# Patient Record
Sex: MALE | Race: WHITE | URBAN - METROPOLITAN AREA
[De-identification: names, ages, dates, MRNs, and addresses within clinical notes are randomized per-mention and may not be internally consistent; named-entity substitution may affect disease eponyms.]

---

## 2017-03-29 PROCEDURE — 81003 URINALYSIS AUTO W/O SCOPE: CPT | Performed by: PHYSICIAN ASSISTANT

## 2017-04-04 PROCEDURE — 82607 VITAMIN B-12: CPT | Performed by: PHYSICIAN ASSISTANT

## 2017-04-04 PROCEDURE — 82746 ASSAY OF FOLIC ACID SERUM: CPT | Performed by: PHYSICIAN ASSISTANT

## 2017-04-04 PROCEDURE — 36415 COLL VENOUS BLD VENIPUNCTURE: CPT | Performed by: PHYSICIAN ASSISTANT

## 2017-05-26 PROBLEM — H25.13 AGE-RELATED NUCLEAR CATARACT, BILATERAL: Status: ACTIVE | Noted: 2017-05-26

## 2017-05-26 PROBLEM — H02.826 SEBACEOUS CYST OF EYELID, LEFT: Status: ACTIVE | Noted: 2017-05-26

## 2018-04-24 PROCEDURE — 81003 URINALYSIS AUTO W/O SCOPE: CPT | Performed by: PHYSICIAN ASSISTANT

## 2018-04-26 PROBLEM — N52.9 ERECTILE DYSFUNCTION, UNSPECIFIED ERECTILE DYSFUNCTION TYPE: Status: ACTIVE | Noted: 2018-04-26

## 2018-05-03 PROCEDURE — 88344 IMHCHEM/IMCYTCHM EA MLT ANTB: CPT | Performed by: UROLOGY

## 2018-05-03 PROCEDURE — 88305 TISSUE EXAM BY PATHOLOGIST: CPT | Performed by: UROLOGY

## 2019-10-10 PROBLEM — I67.1 BRAIN ANEURYSM: Status: ACTIVE | Noted: 2019-10-10

## 2020-03-07 PROBLEM — Z91.19 NON-COMPLIANCE: Status: ACTIVE | Noted: 2020-03-07

## 2020-03-27 PROBLEM — R79.1 PROLONGED BLEEDING TIME: Status: ACTIVE | Noted: 2020-03-27

## 2020-04-03 ENCOUNTER — OFFICE VISIT (OUTPATIENT)
Dept: WOUND CARE | Facility: HOSPITAL | Age: 70
End: 2020-04-03
Attending: NURSE PRACTITIONER
Payer: MEDICARE

## 2020-04-03 DIAGNOSIS — L97.812 NON-PRESSURE CHRONIC ULCER OF OTHER PART OF RIGHT LOWER LEG WITH FAT LAYER EXPOSED (HCC): Primary | ICD-10-CM

## 2020-04-03 DIAGNOSIS — F17.218 NICOTINE DEPENDENCE, CIGARETTES, WITH OTHER NICOTINE-INDUCED DISORDERS: ICD-10-CM

## 2020-04-03 DIAGNOSIS — I87.331 CHRONIC VENOUS HYPERTENSION (IDIOPATHIC) WITH ULCER AND INFLAMMATION OF RIGHT LOWER EXTREMITY (HCC): ICD-10-CM

## 2020-04-03 DIAGNOSIS — L97.919 CHRONIC VENOUS HYPERTENSION (IDIOPATHIC) WITH ULCER AND INFLAMMATION OF RIGHT LOWER EXTREMITY (HCC): ICD-10-CM

## 2020-04-03 PROCEDURE — 29581 APPL MULTLAYER CMPRN SYS LEG: CPT

## 2020-04-03 PROCEDURE — 99215 OFFICE O/P EST HI 40 MIN: CPT

## 2020-04-03 NOTE — PROGRESS NOTES
Subjective    Chief Complaint  This information was obtained from the patient  Pt states wounds started back before Thanksgiving due to scrapping his leg on something at work. He was using Neosporin and a bandaid, but than was told to use only gauze.  Pt ca Current every day smoker - 1 ppd/45yr, Marital Status - , Occupation - works construction, Alcohol Use - beer, Caffeine Use - coffee, Cultural, Yazdanism, or Language Concerns - none, Lives in - home, Lives with - spouse    Medical History  This inf Wound #1 Right, Anterior Lower Leg is a chronic Full Thickness Venous Ulcer and has received a status of Not Healed.  Initial wound encounter measurements are 1.5cm length x 1.5cm width x 0.1cm depth, with an area of 2.25 sq cm and a volume of 0.225 cubic c Physical Exam  Constitutional  bp elevated, patient denies symptoms of headache, dizziness, sob, or vision changes. will continue to monitor, patient reminded to get bp cuff as recommended by primary. Pulse Regular and wnl for patient. Slater August  Respirations easy a Avoid prolonged standing in one place. Elevate leg(s)as much as possible. Follow-Up Appointments  Return Appointment in 1 week. Misc/Additional Orders  Supplement with a daily multivitamin.   Increase dietary protein - look for [facility] (

## 2020-04-10 ENCOUNTER — OFFICE VISIT (OUTPATIENT)
Dept: WOUND CARE | Facility: HOSPITAL | Age: 70
End: 2020-04-10
Attending: NURSE PRACTITIONER
Payer: MEDICARE

## 2020-04-10 DIAGNOSIS — L97.812 NON-PRESSURE CHRONIC ULCER OF OTHER PART OF RIGHT LOWER LEG WITH FAT LAYER EXPOSED (HCC): Primary | ICD-10-CM

## 2020-04-10 DIAGNOSIS — I87.331 CHRONIC VENOUS HYPERTENSION (IDIOPATHIC) WITH ULCER AND INFLAMMATION OF RIGHT LOWER EXTREMITY (HCC): ICD-10-CM

## 2020-04-10 DIAGNOSIS — L97.919 CHRONIC VENOUS HYPERTENSION (IDIOPATHIC) WITH ULCER AND INFLAMMATION OF RIGHT LOWER EXTREMITY (HCC): ICD-10-CM

## 2020-04-10 PROCEDURE — 29581 APPL MULTLAYER CMPRN SYS LEG: CPT

## 2020-04-10 NOTE — PROGRESS NOTES
Subjective    Chief Complaint  This information was obtained from the patient  Patient is here for a wound care follow up. He denies any pain or new wound concerns.     Allergies  No known allergies    HPI  This information was obtained from the patient, mercedes Marked Weight Change, Night Sweats, Chills  Respiratory: Cough, Shortness of Breath  Cardiovascular (Central/Peripheral): Chest Pain, Dyspnea on Exertion, Edema, Intermittent Claudication  Musculoskeletal: Contractures, Assistive Devices  Psychiatric: Kalpana moisture is normal. The periwound skin exhibited: Edema, Hemosiderosis. The periwound skin did not exhibit: Excoriation, Erythema. The temperature of the periwound skin is Warm. Periwound skin does not exhibit signs or symptoms of infection.  Local Pulse is high 30-40 mmhg. The procedure was tolerated well. General Notes:  Calamine layer under Coflex 2 layer        Plan    Additional Orders:    Wound Cleansing & Dressings  May shower with protection.   Cleanse with saline or wound cleanser  Collagen - rebel weeks.     Electronic Signature(s)  Signed By: Date:  Pho[place] SHEPARD, MATTHIAS-AP, CFCN, CSWS, [facility], [address] 04/10/2020 10:25:51       Entered By: Phoenix Piper on 04/10/2020 10:25:38

## 2020-04-24 ENCOUNTER — OFFICE VISIT (OUTPATIENT)
Dept: WOUND CARE | Facility: HOSPITAL | Age: 70
End: 2020-04-24
Attending: NURSE PRACTITIONER
Payer: MEDICARE

## 2020-04-24 DIAGNOSIS — L97.812 NON-PRESSURE CHRONIC ULCER OF OTHER PART OF RIGHT LOWER LEG WITH FAT LAYER EXPOSED (HCC): Primary | ICD-10-CM

## 2020-04-24 DIAGNOSIS — I87.331 CHRONIC VENOUS HYPERTENSION (IDIOPATHIC) WITH ULCER AND INFLAMMATION OF RIGHT LOWER EXTREMITY (HCC): ICD-10-CM

## 2020-04-24 DIAGNOSIS — L97.919 CHRONIC VENOUS HYPERTENSION (IDIOPATHIC) WITH ULCER AND INFLAMMATION OF RIGHT LOWER EXTREMITY (HCC): ICD-10-CM

## 2020-04-24 PROCEDURE — 99213 OFFICE O/P EST LOW 20 MIN: CPT

## 2020-04-24 NOTE — PROGRESS NOTES
Subjective    Chief Complaint  This information was obtained from the patient  Patient is here for a wound care follow up. He denies any pain or new wound concerns.     Allergies  No known allergies    HPI  This information was obtained from the patient, mercedes Glasses / Contacts  Cardiovascular (Central/Peripheral):  Other (htn)  Integumentary (Hair/Skin/Nails): Dryness, Itching, Ulcers  Psychiatric: Nervousness / Tension    Patient denies complaints or symptoms related to:  Constitutional Symptoms (General Healt The temperature of the periwound skin is Warm. Periwound skin does not exhibit signs or symptoms of infection.  Local Pulse is Normal.    Vitals  Height/Length: 69 in (175.26 cm), Weight: 140 lbs (63.64 kgs), BMI: 20.7, (36.5 °C), Pulse: 66 bpm, Respiratory nicotine-induced disorders        Plan    Additional Orders: Follow-Up Appointments:  Discharge from Outpatient Services. Other: - continue to protect the wounds for another 2 weeks-wear your compression stocking for 3 more weeks.     return as needed

## 2020-06-22 PROBLEM — M70.62 TROCHANTERIC BURSITIS, LEFT HIP: Status: ACTIVE | Noted: 2020-06-22

## 2021-01-29 PROBLEM — H52.4 MYOPIA OF BOTH EYES WITH ASTIGMATISM AND PRESBYOPIA: Status: ACTIVE | Noted: 2021-01-29

## 2021-01-29 PROBLEM — H43.392 VITREOUS SYNERESIS, LEFT: Status: ACTIVE | Noted: 2021-01-29

## 2021-01-29 PROBLEM — H52.13 MYOPIA OF BOTH EYES WITH ASTIGMATISM AND PRESBYOPIA: Status: ACTIVE | Noted: 2021-01-29

## 2021-01-29 PROBLEM — H25.813 COMBINED FORMS OF AGE-RELATED CATARACT OF BOTH EYES: Status: ACTIVE | Noted: 2021-01-29

## 2021-01-29 PROBLEM — H52.203 MYOPIA OF BOTH EYES WITH ASTIGMATISM AND PRESBYOPIA: Status: ACTIVE | Noted: 2021-01-29

## 2021-04-20 PROBLEM — M19.011 ARTHRITIS OF RIGHT SHOULDER REGION: Status: ACTIVE | Noted: 2021-04-20

## 2021-04-20 PROBLEM — M75.21 BICEPS TENDINITIS OF RIGHT SHOULDER: Status: ACTIVE | Noted: 2021-04-20

## 2021-04-22 PROBLEM — M67.921 BICEPS TENDINOPATHY, RIGHT: Status: ACTIVE | Noted: 2021-04-20

## 2021-04-22 PROBLEM — M24.111 LABRAL TEAR OF SHOULDER, DEGENERATIVE, RIGHT: Status: ACTIVE | Noted: 2021-04-22

## 2021-04-22 PROBLEM — M19.011 OSTEOARTHRITIS OF RIGHT GLENOHUMERAL JOINT: Status: ACTIVE | Noted: 2021-04-20

## 2022-01-28 PROBLEM — H43.393 VITREOUS SYNERESIS OF BOTH EYES: Status: ACTIVE | Noted: 2021-01-29

## 2025-01-01 ENCOUNTER — HOSPITAL ENCOUNTER (EMERGENCY)
Age: 75
End: 2025-01-02
Attending: EMERGENCY MEDICINE

## 2025-01-01 VITALS — TEMPERATURE: 89.6 F

## 2025-01-01 DIAGNOSIS — I46.9 CARDIAC ARREST (CMD): Primary | ICD-10-CM

## 2025-01-01 LAB — GLUCOSE BLDC GLUCOMTR-MCNC: 88 MG/DL (ref 70–99)

## 2025-01-01 PROCEDURE — 99285 EMERGENCY DEPT VISIT HI MDM: CPT

## 2025-01-01 PROCEDURE — 99291 CRITICAL CARE FIRST HOUR: CPT | Performed by: EMERGENCY MEDICINE

## 2025-01-01 PROCEDURE — 10002800 HB RX 250 W HCPCS: Performed by: EMERGENCY MEDICINE

## 2025-01-01 PROCEDURE — 92950 HEART/LUNG RESUSCITATION CPR: CPT

## 2025-01-01 PROCEDURE — 82962 GLUCOSE BLOOD TEST: CPT

## 2025-01-01 PROCEDURE — 10004281 HB COUNTER-STAFF TIME PER 15 MIN

## 2025-01-01 PROCEDURE — 10002801 HB RX 250 W/O HCPCS: Performed by: EMERGENCY MEDICINE

## 2025-01-01 RX ORDER — MAGNESIUM SULFATE HEPTAHYDRATE 500 MG/ML
INJECTION, SOLUTION INTRAMUSCULAR; INTRAVENOUS PRN
Status: COMPLETED | OUTPATIENT
Start: 2025-01-01 | End: 2025-01-01

## 2025-01-01 RX ORDER — AMIODARONE HYDROCHLORIDE 150 MG/3ML
INJECTION, SOLUTION INTRAVENOUS PRN
Status: COMPLETED | OUTPATIENT
Start: 2025-01-01 | End: 2025-01-01

## 2025-01-01 RX ORDER — CALCIUM CHLORIDE 100 MG/ML
INJECTION INTRAVENOUS; INTRAVENTRICULAR PRN
Status: COMPLETED | OUTPATIENT
Start: 2025-01-01 | End: 2025-01-01

## 2025-01-01 RX ADMIN — EPINEPHRINE 1 MG: 0.1 INJECTION INTRAVENOUS at 13:03

## 2025-01-01 RX ADMIN — EPINEPHRINE 1 MG: 0.1 INJECTION INTRAVENOUS at 13:07

## 2025-01-01 RX ADMIN — EPINEPHRINE 1 MG: 0.1 INJECTION INTRAVENOUS at 13:01

## 2025-01-01 RX ADMIN — EPINEPHRINE 1 MG: 0.1 INJECTION INTRAVENOUS at 12:57

## 2025-01-01 RX ADMIN — EPINEPHRINE 1 MG: 0.1 INJECTION INTRAVENOUS at 13:19

## 2025-01-01 RX ADMIN — AMIODARONE HYDROCHLORIDE 300 MG: 50 INJECTION, SOLUTION INTRAVENOUS at 12:56

## 2025-01-01 RX ADMIN — MAGNESIUM SULFATE HEPTAHYDRATE 1 G: 500 INJECTION, SOLUTION INTRAMUSCULAR; INTRAVENOUS at 13:06

## 2025-01-01 RX ADMIN — EPINEPHRINE 1 MG: 0.1 INJECTION INTRAVENOUS at 13:16

## 2025-01-01 RX ADMIN — SODIUM BICARBONATE 50 MEQ: 84 INJECTION, SOLUTION INTRAVENOUS at 12:59

## 2025-01-01 RX ADMIN — CALCIUM CHLORIDE 1 G: 100 INJECTION INTRAVENOUS; INTRAVENTRICULAR at 12:58

## 2025-01-01 RX ADMIN — EPINEPHRINE 1 MG: 0.1 INJECTION INTRAVENOUS at 13:13
